# Patient Record
Sex: FEMALE | Race: WHITE
[De-identification: names, ages, dates, MRNs, and addresses within clinical notes are randomized per-mention and may not be internally consistent; named-entity substitution may affect disease eponyms.]

---

## 2022-01-14 ENCOUNTER — HOSPITAL ENCOUNTER (EMERGENCY)
Dept: HOSPITAL 5 - ED | Age: 22
LOS: 1 days | Discharge: HOME | End: 2022-01-15
Payer: MEDICAID

## 2022-01-14 DIAGNOSIS — N83.209: ICD-10-CM

## 2022-01-14 DIAGNOSIS — Z3A.01: ICD-10-CM

## 2022-01-14 DIAGNOSIS — O34.81: Primary | ICD-10-CM

## 2022-01-14 DIAGNOSIS — O02.81: ICD-10-CM

## 2022-01-14 PROCEDURE — 87076 CULTURE ANAEROBE IDENT EACH: CPT

## 2022-01-14 PROCEDURE — 87186 SC STD MICRODIL/AGAR DIL: CPT

## 2022-01-14 PROCEDURE — 99284 EMERGENCY DEPT VISIT MOD MDM: CPT

## 2022-01-14 PROCEDURE — 85025 COMPLETE CBC W/AUTO DIFF WBC: CPT

## 2022-01-14 PROCEDURE — 84702 CHORIONIC GONADOTROPIN TEST: CPT

## 2022-01-14 PROCEDURE — 76817 TRANSVAGINAL US OBSTETRIC: CPT

## 2022-01-14 PROCEDURE — 87086 URINE CULTURE/COLONY COUNT: CPT

## 2022-01-14 PROCEDURE — 81001 URINALYSIS AUTO W/SCOPE: CPT

## 2022-01-14 PROCEDURE — 36415 COLL VENOUS BLD VENIPUNCTURE: CPT

## 2022-01-14 PROCEDURE — 76801 OB US < 14 WKS SINGLE FETUS: CPT

## 2022-01-15 VITALS — SYSTOLIC BLOOD PRESSURE: 118 MMHG | DIASTOLIC BLOOD PRESSURE: 50 MMHG

## 2022-01-15 LAB
BACTERIA #/AREA URNS HPF: (no result) /HPF
BASOPHILS # (AUTO): 0.1 K/MM3 (ref 0–0.1)
BASOPHILS NFR BLD AUTO: 1 % (ref 0–1.8)
BILIRUB UR QL STRIP: (no result)
BLOOD UR QL VISUAL: (no result)
EOSINOPHIL # BLD AUTO: 0.1 K/MM3 (ref 0–0.4)
EOSINOPHIL NFR BLD AUTO: 2.1 % (ref 0–4.3)
HCT VFR BLD CALC: 38.9 % (ref 30.3–42.9)
HGB BLD-MCNC: 12.6 GM/DL (ref 10.1–14.3)
LYMPHOCYTES # BLD AUTO: 2.6 K/MM3 (ref 1.2–5.4)
LYMPHOCYTES NFR BLD AUTO: 44.2 % (ref 13.4–35)
MCHC RBC AUTO-ENTMCNC: 33 % (ref 30–34)
MCV RBC AUTO: 86 FL (ref 79–97)
MONOCYTES # (AUTO): 0.6 K/MM3 (ref 0–0.8)
MONOCYTES % (AUTO): 10.6 % (ref 0–7.3)
MUCOUS THREADS #/AREA URNS HPF: (no result) /HPF
PH UR STRIP: 5 [PH] (ref 5–7)
PLATELET # BLD: 310 K/MM3 (ref 140–440)
PROT UR STRIP-MCNC: (no result) MG/DL
RBC # BLD AUTO: 4.55 M/MM3 (ref 3.65–5.03)
RBC #/AREA URNS HPF: 38 /HPF (ref 0–6)
UROBILINOGEN UR-MCNC: 2 MG/DL (ref ?–2)
WBC #/AREA URNS HPF: 12 /HPF (ref 0–6)

## 2022-01-15 NOTE — EMERGENCY DEPARTMENT REPORT
ED Female  HPI





- General


Chief complaint: Vaginal Bleeding


Stated complaint: Pregnancy, bleeding


Time Seen by Provider: 01/15/22 01:48


Source: patient


Mode of arrival: Ambulatory


Limitations: No Limitations





- History of Present Illness


Initial comments: 





21-year-old female reports being 6 weeks pregnant and been developing some vague

crampy bleeding to the pelvic area earlier today which has continued since the 

onset.  No nausea vomiting, fever, chills, sweats.  No chest pain or 

palpitations.


MD Complaint: vaginal bleeding


-: Gradual


Location: suprapubic


Radiation: non-radiating, LLQ


Severity scale (0 -10): 1


Consistency: constant


Improves with: none





ED Review of Systems


ROS: 


Stated complaint: Pregnancy, bleeding


Other details as noted in HPI





Comment: All other systems reviewed and negative





ED Past Medical Hx





- Past Medical History


Previous Medical History?: No





- Surgical History


Past Surgical History?: No





ED Physical Exam





- General


Limitations: No Limitations


General appearance: alert, in no apparent distress





- Head


Head exam: Present: atraumatic, normocephalic





- Eye


Eye exam: Present: normal appearance





- ENT


ENT exam: Present: normal exam, mucous membranes moist





- Neck


Neck exam: Present: normal inspection





- Respiratory


Respiratory exam: Present: normal lung sounds bilaterally.  Absent: respiratory 

distress





- Cardiovascular


Cardiovascular Exam: Present: regular rate, normal rhythm.  Absent: systolic 

murmur, diastolic murmur, rubs, gallop





- GI/Abdominal


GI/Abdominal exam: Present: soft, normal bowel sounds.  Absent: tenderness, 

guarding, rebound, hypoactive bowel sounds, organomegaly





- Extremities Exam


Extremities exam: Present: normal inspection





- Back Exam


Back exam: Present: normal inspection





- Neurological Exam


Neurological exam: Present: alert, oriented X3





- Psychiatric


Psychiatric exam: Present: normal affect, normal mood





- Skin


Skin exam: Present: warm, dry, intact, normal color.  Absent: rash





ED Course


                                   Vital Signs











  01/15/22





  01:35


 


Temperature 99.0 F


 


Pulse Rate 79


 


Respiratory 16





Rate 


 


Blood Pressure 118/50





[Right] 


 


O2 Sat by Pulse 100





Oximetry 














ED Medical Decision Making





- Lab Data


Result diagrams: 


                                 01/15/22 02:07








- Radiology Data


Radiology results: report reviewed





[facility]  


                                     [address] [place] [address]  


 


                                         Ultrasound Report   


                                               Signed  


 


Patient: CORDELIA,SPECIAL SO                                                 

               MR#:   


[id number]          


: 2000                                                                

Acct:[id number]      


 


Age/Sex: 21 / F                                                                

ADM Date: 22     


 


Loc: ED       


Attending Dr:   


 


 


Ordering Physician: NGUYEN STARK  


Date of Service: 01/15/22  


Procedure(s): US OB transvaginal  


Accession Number(s): [id number]  


 


cc: NGUYEN STARK   


 


 


ULTRASOUND OBSTETRIC   


 


 INDICATION / CLINICAL INFORMATION: Vaginal bleeding.  


 Clinical Gestational Age (GA) in weeks, days: 4, 6   


 


 TECHNIQUE: Transabdominal and Transvaginal.  


 


 COMPARISON: None available.  


 


 FINDINGS:  


 GESTATIONAL SAC: No intrauterine gestational sac   


 YOLK SAC: Not visualized.  


 


 UTERUS: No intrauterine pregnancy. Normal endometrial stripe.  


 


 


 ADNEXA: 0.8 cm minimally complex cyst in the right ovary.  


 FREE FLUID: Trace free fluid in the pelvis.  


 


 ADDITIONAL FINDINGS: None.  


 


 IMPRESSION:  


 1. No intrauterine pregnancy.  


 2. Small, mildly complex right ovarian cystic lesion. Trace free fluid in the 

pelvis.  


 


 Signer Name: CAROLYN Chase MD   


 Signed: 1/15/2022 4:38 AM  


 Workstation Name: [id number]   


 


 


Transcribed By: DT  


Dictated By: Tulio Chase MD  


Electronically Authenticated By: Tulio Chase MD    


Signed Date/Time: 01/15/22 0438                                


 


 


 


DD/DT: 01/15/22 0426                                                            

  


TD/TT:























Print Cancel 





- Medical Decision Making





this suspected pregnant patient presents with vaginal bleeding in what would be 

the first trimester, differential diagnosis includes ectopic pregnancy, IUP, 

month threatened/inevitable , along with a completed .  Patient 

is HDS and without a history of coagulopathy or infectious symptoms.  





The ultrasound does not an IUP  with an elevated hCG quant





Based on exam history and ED work-up patient presentation is not consistent with

 an ectopic pregnancy, life-threatening coagulopathy, trauma, serious bacterial 

infection, central process or other emergency


Critical care attestation.: 


If time is entered above; I have spent that time in minutes in the direct care o

f this critically ill patient, excluding procedure time.








ED Disposition


Clinical Impression: 


 Ovarian cyst, Elevated serum hCG





Disposition:  HOME / SELF CARE / HOMELESS


Is pt being admited?: No


Does the pt Need Aspirin: No


Condition: Stable


Additional Instructions: 


[facility]  


                                     [address] [place] [address]  


 


                                         Ultrasound Report   


                                               Signed  


 


Patient: CORDELIA,SPECIAL SO                                                 

               MR#:   


[id number]          


: 2000                                                                

Acct:[id number]      


 


Age/Sex: 21 / F                                                                

ADM Date: 22     


 


Loc: ED       


Attending Dr:   


 


 


Ordering Physician: NGUYEN STARK  


Date of Service: 01/15/22  


Procedure(s): US OB transvaginal  


Accession Number(s): [id number]  


 


cc: NGUYEN STARK   


 


 


ULTRASOUND OBSTETRIC   


 


 INDICATION / CLINICAL INFORMATION: Vaginal bleeding.  


 Clinical Gestational Age (GA) in weeks, days: 4, 6   


 


 TECHNIQUE: Transabdominal and Transvaginal.  


 


 COMPARISON: None available.  


 


 FINDINGS:  


 GESTATIONAL SAC: No intrauterine gestational sac   


 YOLK SAC: Not visualized.  


 


 UTERUS: No intrauterine pregnancy. Normal endometrial stripe.  


 


 


 ADNEXA: 0.8 cm minimally complex cyst in the right ovary.  


 FREE FLUID: Trace free fluid in the pelvis.  


 


 ADDITIONAL FINDINGS: None.  


 


 IMPRESSION:  


 1. No intrauterine pregnancy.  


 2. Small, mildly complex right ovarian cystic lesion. Trace free fluid in the 

pelvis.  


 


 Signer Name: CAROLYN Chase MD   


 Signed: 1/15/2022 4:38 AM  


 Workstation Name: [id number]   


 


 


Transcribed By: DT  


Dictated By: Tulio Chase MD  


Electronically Authenticated By: Tulio Chase MD    


Signed Date/Time: 01/15/22 0438                                


 


 


 


DD/DT: 01/15/22 0426                                                            

  


TD/TT:























Print Cancel 


Referrals: 


[facility] [Provider Group] - 2-3 Days (She will follow-up with OB/GYN 

to have your hormones read evaluated to ensure they are still going up with 

hormone levels at less than 500 is very likely that you are not following the 

following pregnancy to see anything on ultrasound)

## 2022-01-15 NOTE — ULTRASOUND REPORT
ULTRASOUND OBSTETRIC 



INDICATION / CLINICAL INFORMATION: Vaginal bleeding.

Clinical Gestational Age (GA) in weeks, days: 4, 6 



TECHNIQUE: Transabdominal and Transvaginal.



COMPARISON: None available.



FINDINGS:

GESTATIONAL SAC: No intrauterine gestational sac 

YOLK SAC: Not visualized.



UTERUS: No intrauterine pregnancy. Normal endometrial stripe.





ADNEXA: 0.8 cm minimally complex cyst in the right ovary.

FREE FLUID: Trace free fluid in the pelvis.



ADDITIONAL FINDINGS: None.



IMPRESSION:

1. No intrauterine pregnancy.

2. Small, mildly complex right ovarian cystic lesion. Trace free fluid in the pelvis.



Signer Name: CAROLYN Chase MD 

Signed: 1/15/2022 4:38 AM

Workstation Name: [id number]

## 2022-01-15 NOTE — ULTRASOUND REPORT
ULTRASOUND OBSTETRIC 



INDICATION / CLINICAL INFORMATION: Vaginal bleeding.

Clinical Gestational Age (GA) in weeks, days: 4, 6 



TECHNIQUE: Transabdominal and Transvaginal.



COMPARISON: None available.



FINDINGS:

GESTATIONAL SAC: No intrauterine gestational sac 

YOLK SAC: Not visualized.



UTERUS: No intrauterine pregnancy. Normal endometrial stripe.





ADNEXA: 0.8 cm minimally complex cyst in the right ovary.

FREE FLUID: Trace free fluid in the pelvis.



ADDITIONAL FINDINGS: None.



IMPRESSION:

1. No intrauterine pregnancy.

2. Small, mildly complex right ovarian cystic lesion. Trace free fluid in the pelvis.



Signer Name: CAROLYN Chase MD 

Signed: 1/15/2022 4:38 AM

Workstation Name: [id number]